# Patient Record
Sex: MALE | Race: WHITE | NOT HISPANIC OR LATINO
[De-identification: names, ages, dates, MRNs, and addresses within clinical notes are randomized per-mention and may not be internally consistent; named-entity substitution may affect disease eponyms.]

---

## 2024-04-26 PROBLEM — Z00.00 ENCOUNTER FOR PREVENTIVE HEALTH EXAMINATION: Status: ACTIVE | Noted: 2024-04-26

## 2024-05-01 NOTE — PHYSICAL EXAM
[General Appearance - Alert] : alert [General Appearance - In No Acute Distress] : in no acute distress [Fluency] : fluency intact [Comprehension] : comprehension intact [Cranial Nerves Optic (II)] : visual acuity intact bilaterally,  pupils equal round and reactive to light [Cranial Nerves Oculomotor (III)] : extraocular motion intact [Cranial Nerves Trigeminal (V)] : facial sensation intact symmetrically [Cranial Nerves Facial (VII)] : face symmetrical [Cranial Nerves Vestibulocochlear (VIII)] : hearing was intact bilaterally [Cranial Nerves Glossopharyngeal (IX)] : tongue and palate midline [Cranial Nerves Accessory (XI - Cranial And Spinal)] : head turning and shoulder shrug symmetric [Cranial Nerves Hypoglossal (XII)] : there was no tongue deviation with protrusion [Motor Strength] : muscle strength was normal in all four extremities [Sensation Tactile Decrease] : light touch was intact [Abnormal Walk] : normal gait [2+] : Ankle jerk left 2+ [Normal] : normal

## 2024-05-02 ENCOUNTER — NON-APPOINTMENT (OUTPATIENT)
Age: 59
End: 2024-05-02

## 2024-05-03 ENCOUNTER — NON-APPOINTMENT (OUTPATIENT)
Age: 59
End: 2024-05-03

## 2024-05-03 ENCOUNTER — APPOINTMENT (OUTPATIENT)
Dept: NEUROSURGERY | Facility: CLINIC | Age: 59
End: 2024-05-03
Payer: COMMERCIAL

## 2024-05-03 VITALS — DIASTOLIC BLOOD PRESSURE: 116 MMHG | OXYGEN SATURATION: 97 % | HEART RATE: 85 BPM | SYSTOLIC BLOOD PRESSURE: 173 MMHG

## 2024-05-03 DIAGNOSIS — M47.816 SPONDYLOSIS W/OUT MYELOPATHY OR RADICULOPATHY, LUMBAR REGION: ICD-10-CM

## 2024-05-03 DIAGNOSIS — M71.38 OTHER BURSAL CYST, OTHER SITE: ICD-10-CM

## 2024-05-03 PROCEDURE — 99205 OFFICE O/P NEW HI 60 MIN: CPT

## 2024-05-03 NOTE — ASSESSMENT
[FreeTextEntry1] : EDUAR TOLENTINO is a 59 year old male with a PMH of GERD, depression, melanoma, who presents to the office today for neurosurgical consultation at the request of Dr. Chetna Dickey due to neural foramen cyst and lumbar disc herniation. He has a history of low-back pain, however one month ago it returned in severe fashion and included pain radiating into the right groin as well as down the right anterior thigh to the knee. He does note that he was sitting at a desk nearly non-stop for work for the ~10 weeks preceding this event. The pain been starting to improve.   I reviewed his MRI lumbar spine in the office today using the spine model to aid in interpretation. There is significant loss of L4-5 disc height with endplate edema of L4 and L5. There is a disc bulge resulting in bilateral foraminal stenosis. At L2-3 there is a cystic finding in the right sided foramen resulting in compression on the right sided L2 nerve.   In regards to the L2-L3 neural foramen cyst, I explained that this is likely not a tumor and a very benign process. Based on imaging, it may represent a synovial cyst due to facet joint arthropathy. It may also represent a benign fatty or CSF collection as well. I recommend that he obtain a repeat MRI in 6 months time to ensure that this is not growing.  I recommend additional imaging in the form of upright lumbar spine X-rays with flexion and extension views to demonstrate the regional spinal alignment and to rule out any visible dynamic instability.   In the end, I recommend conservative management in the form of physical therapy and pain management.  The patient may return to the office in six weeks.  The patient understands the plan of care and is in agreement.  All questions answered to patient satisfaction.  I spent 60 minutes relative to this patient encounter.

## 2024-05-03 NOTE — CONSULT LETTER
[Dear  ___] : Dear  [unfilled], [Consult Letter:] : I had the pleasure of evaluating your patient, [unfilled]. [Please see my note below.] : Please see my note below. [Consult Closing:] : Thank you very much for allowing me to participate in the care of this patient.  If you have any questions, please do not hesitate to contact me. [Sincerely,] : Sincerely, [FreeTextEntry3] : Sae Christine M.D.

## 2024-05-03 NOTE — HISTORY OF PRESENT ILLNESS
[de-identified] : EDUAR TOLENTINO is a 59 year old male with a PMH of GERD, depression, melanoma removed from nose, who presents to the office today for neurosurgical consultation at the request of Dr. Chetna Dickey due to neural foramen cyst and lumbar disc herniation.   The pain is characterized as burning and pulling in nature.  It started four years ago lower back pain started, improved and came back one month ago improved past few days not as intense. He had a fall on ice 30 years ago that took two weeks to recover. Saw chiropractor again worsened symptoms. It is exacerbated by standing and relieved by sitting, elevating legs. Limiting walking due to pain and interferes with sleep, interferes with drumming. It radiates right lower back to right groin mostly and occasionally right foot. There has been no known trauma precipitating the pain. She has difficulty sleeping at night due to the pain. The patient endorses numbness of right foot weakness of extremities, bowel or bladder incontinence and gait disturbance.  He has tried home stretches, chiropractic manipulations, acupuncture, and pain medications including Tramadol, Advil, Gabapentin, Meloxicam in the last month without relief.  He has undergone imaging in the form of MRI lumbar spine which revealed there is nonspecific 1 cm cyst within the right L2-L3 neural foramen contributing to at least moderate narrowing. It is unclear whether the cyst is arising from a mildly degenerated right L2-L3 facet articulation. Multilevel discogenic degenerative disease and facet arthropathy of the lumbar spine, most pronounced at L4-L5 where there is moderate bilateral neural foraminal narrowing. Additional varying degrees of central canal and neural foraminal narrowing (see detailed report below).  Family history: Father- multiple myeloma Surg Hx:  appendectomy Meds:  omeprazole, allergic nasal spray Allergies: NKDA, seasonal allergies Soc Hx: nonsmoker, wine EtOH few glasses nightly, lives with wife, works as  in advertising

## 2024-05-08 ENCOUNTER — APPOINTMENT (OUTPATIENT)
Dept: PAIN MANAGEMENT | Facility: CLINIC | Age: 59
End: 2024-05-08
Payer: COMMERCIAL

## 2024-05-08 ENCOUNTER — RESULT REVIEW (OUTPATIENT)
Age: 59
End: 2024-05-08

## 2024-05-08 VITALS
WEIGHT: 190 LBS | BODY MASS INDEX: 29.82 KG/M2 | HEIGHT: 67 IN | SYSTOLIC BLOOD PRESSURE: 142 MMHG | DIASTOLIC BLOOD PRESSURE: 87 MMHG

## 2024-05-08 DIAGNOSIS — M47.817 SPONDYLOSIS W/OUT MYELOPATHY OR RADICULOPATHY, LUMBOSACRAL REGION: ICD-10-CM

## 2024-05-08 DIAGNOSIS — M54.16 RADICULOPATHY, LUMBAR REGION: ICD-10-CM

## 2024-05-08 DIAGNOSIS — G89.4 CHRONIC PAIN SYNDROME: ICD-10-CM

## 2024-05-08 DIAGNOSIS — M79.18 MYALGIA, OTHER SITE: ICD-10-CM

## 2024-05-08 DIAGNOSIS — Z86.79 PERSONAL HISTORY OF OTHER DISEASES OF THE CIRCULATORY SYSTEM: ICD-10-CM

## 2024-05-08 PROCEDURE — 99204 OFFICE O/P NEW MOD 45 MIN: CPT

## 2024-05-08 PROCEDURE — G2211 COMPLEX E/M VISIT ADD ON: CPT

## 2024-05-08 RX ORDER — OMEPRAZOLE 20 MG/1
20 CAPSULE, DELAYED RELEASE ORAL
Refills: 0 | Status: ACTIVE | COMMUNITY

## 2024-05-08 RX ORDER — VALSARTAN 80 MG/1
80 TABLET, COATED ORAL
Refills: 0 | Status: ACTIVE | COMMUNITY

## 2024-05-08 NOTE — HISTORY OF PRESENT ILLNESS
[Back Pain] : back pain [___ mths] : [unfilled] month(s) ago [Constant] : constant [1] : a current pain level of 1/10 [4] : an average pain level of 4/10 [2] : a minimum pain level of 2/10 [10] : a maximum pain level of 10/10 [Sharp] : sharp [Shooting] : shooting [Electric] : electric [Laying] : laying [Sitting] : sitting [Standing] : standing [Medications] : medications [Heat] : heat [Ice] : ice [FreeTextEntry1] : HPI     Mr. EDUAR TOLENTINO is a 59 year M with pmhx of HTN, hx right sided lumbar radiculopathy. Exacerbation of right sided lower back pain that radiates to right posterolateral thigh and right groin that began one month ago. Pain has since improved. No longer requiring prn Tylenol, NSAID, Tramadol. Pending X ray LS per NS Dr Christine. Planning to start PT. Has some concerns about pain as he is worried it may return on long drive to his son's graduation on Friday. Denies any additional weakness, numbness, bowel/bladder dysfunction, history of bleeding disorders.    Previous and current pain medications/doses/effects: Tylenol and NSAID     Previous Pain Treatments: None     Previous Pain Injections: None     Previous Diagnostic Studies/Images: 4/22/24 MRI LUMBAR SPINE  ORDER #: [id number] CC: ; ; ; End of cc's  CLINICAL INFORMATION: Low back pain  ADDITIONAL CLINICAL INFORMATION: Not Applicable  TECHNIQUE: Multiplanar, multisequence MRI was performed of the lumbar spine. IV Contrast: NONE  PRIOR STUDIES: No priors available for comparison.  FINDINGS:  LOCALIZER: No additional findings. BONES: There is no fracture or bone marrow edema. Prominent Modic endplate changes at L4-L5. ALIGNMENT: The alignment is normal. SACROILIAC JOINTS/SACRUM: There is no sacral fracture. The SI joints are partially visualized but are intact. CONUS AND CAUDA EQUINA: The distal cord and conus are normal in signal. Conus terminates at L1. VISUALIZED INTRAPELVIC/INTRA-ABDOMINAL SOFT TISSUES: Normal. PARASPINAL SOFT TISSUES: Normal.   INDIVIDUAL LEVELS:  LOWER THORACIC SPINE: No spinal canal or neuroforaminal stenosis.  L1-L2: No spinal canal or neuroforaminal stenosis. L2-L3: There is a 1.4 cm cyst within the right neural foramen contributing to at least moderate neural foraminal narrowing. It is unclear whether this cyst is arising from the mildly degenerated right L2-L3 facet articulation. No significant left neural foraminal narrowing or central canal narrowing. L3-L4: Broad-based posterior disc bulge and mild to moderate bilateral facet arthropathy result in mild to moderate left neural foraminal narrowing, mild right neural foraminal narrowing but no significant central canal narrowing. L4-L5: Broad-based posterior disc bulge and moderate bilateral facet arthropathy result in moderate bilateral neural foraminal narrowing but no significant central canal narrowing. L5-S1: Minimal posterior disc bulge with superimposed right intraforaminal disc protrusion in conjunction with moderate bilateral facet arthropathy results in mild right neural foraminal narrowing but no significant left neural foraminal narrowing or central canal narrowing.   IMPRESSION:  There is a nonspecific 1 cm cyst within the right L2-L3 neural foramen contributing to at least moderate narrowing. It is unclear whether this cyst is arising from a mildly degenerated right L2-L3 facet articulation.  Multilevel discogenic degenerative disease and facet arthropathy of the lumbar spine, most pronounced at L4-L5 where there is moderate bilateral neural foraminal narrowing. Additional varying degrees of central canal and neural foraminal narrowing, as above.     [FreeTextEntry7] : Lower back pain

## 2024-05-08 NOTE — PHYSICAL EXAM
[Normal] : Well developed, in no acute distress, alert and oriented to person, place and time [Normal muscle bulk without asymmetry] : normal muscle bulk without asymmetry [Facet Tenderness] : facet tenderness [Spine: Flexion to ___ degrees, without pain] : spine: flexion to [unfilled] degrees, without pain [] : Motor:

## 2024-05-08 NOTE — REASON FOR VISIT
[Initial Consultation] : an initial pain management consultation [FreeTextEntry2] : Ref by Dr. Christine

## 2024-05-08 NOTE — CONSULT LETTER
[Dear  ___] : Dear  [unfilled], [Consult Letter:] : I had the pleasure of evaluating your patient, [unfilled]. [Please see my note below.] : Please see my note below. [Consult Closing:] : Thank you very much for allowing me to participate in the care of this patient.  If you have any questions, please do not hesitate to contact me. [Sincerely,] : Sincerely, [FreeTextEntry3] :  Clinton Dickey DO, MBA Director, [facility]  of Anesthesiology [facility]

## 2024-05-08 NOTE — ASSESSMENT
[FreeTextEntry1] : >> Imaging and Other Studies   I personally reviewed the relevant imaging.  Discussed and explained to patient the likely source of pathology and pain.  Questions answered. MRI   >> Therapy and Other Modalities  PT   >> Medications  acetaminophen 650mg q8h prn pain (caution <3g daily)  I advised EDUAR that the NSAID should be taken with food.  In addition while taking the prescribed NSAID, no over the counter or other NSAIDs should be used, such as ibuprofen (Motrin or Advil) or naproxen (Aleve) as this can cause stomach upset or other side effects.  If needed for fever or breakthrough pain Tylenol can be used.   >> Interventions   n/a  >> Consults   continue care with spine  >> Discussion of Risks/Benefits/Alternatives    >Regarding any scheduled procedures:   In the event the patient is scheduled for a procedure,   I have discussed in detail with the patient that any interventional pain procedure is associated with potential risks.  The procedure may include an injection of steroids and potentially other medications (local anesthetic and normal saline) into the epidural space or surrounding tissue of the spine.  There are significant risks of this procedure which include and are not limited to infection, bleeding, worsening pain, dural puncture leading to postdural puncture headache, nerve damage, spinal cord injury, paralysis, stroke, and death.   There is a chance that the procedure does not improve their pain.   There are risks associated with the steroid being absorbed into the body systemically.  These include dysphoria, difficulty sleeping, mood swings and personality changes.  Premenopausal women may notice an irregularity in her menstrual cycle for 2-3 months following the injection.  Steroids can specifically affect patients with hypertension, diabetes, and peptic ulcers.  The procedure may cause a temporary increase in blood pressure and blood pressure, and may adversely affect a peptic ulcer.  Other, more rare complications, include avascular necrosis of joints, glaucoma and worsening of osteoporosis.   I have discussed the risks of the procedure at length with the patient, and the potential benefits of pain relief.  I have offered alternatives to the procedure.  All questions were answered.   The patient expressed understanding and wishes to proceed with the procedure.   > Longitudinal management of Complex Painful condition   The patient is being managed for a complex condition that requires ongoing management.  The nature of this condition demands nuanced approach to treatment.  The seriousness of the condition necessitates an in-depth and focused approach to management and coordination with other healthcare professionals.    This visit involves intricate evaluation and management of the patient's condition.  The complexity of the visit was due to the need for detailed assessment of the current state, consideration of potential complications and a careful balancing of treatment options to management the chronic condition effectively.   As detailed above, the patient has a chronic significant painful condition that requires regular and detailed management.  The condition's impact on the patient's quality of life and health is substantial and necessitates a comprehensive and tailored approach   >> Conclusion   There were no barriers to communication. Informed patient that I would be available for any additional questions. Patient was instructed to call with any worsening symptoms including severe pain, new numbness/weakness, or changes in the bowel/bladder function. Discussed role of nsaids in pain management and all relevant risks, if patient is continuing to require after 4 weeks the patient should f/u for alternative treatment. Instructed patient to maintain pain diary to monitor pain level, mobility, and function.

## 2024-05-21 ENCOUNTER — NON-APPOINTMENT (OUTPATIENT)
Age: 59
End: 2024-05-21